# Patient Record
Sex: FEMALE | URBAN - METROPOLITAN AREA
[De-identification: names, ages, dates, MRNs, and addresses within clinical notes are randomized per-mention and may not be internally consistent; named-entity substitution may affect disease eponyms.]

---

## 2019-03-18 ENCOUNTER — NURSE TRIAGE (OUTPATIENT)
Dept: NURSING | Facility: CLINIC | Age: 64
End: 2019-03-18

## 2019-03-18 NOTE — TELEPHONE ENCOUNTER
Completed antibiotics for a UTI days ago and is again experiencing blood in the urine.  Will see provider within 24 hours.  Lucille Alex RN  [facility]      Reason for Disposition    Blood in urine, but all triage questions negative  (Exception: could be normal menstrual bleeding)    Additional Information    Negative: Shock suspected (e.g., cold/pale/clammy skin, too weak to stand, low BP, rapid pulse)    Negative: Sounds like a life-threatening emergency to the triager    Negative: Recent back or abdominal injury    Negative: Recent genital injury    Negative: [1] Unable to urinate (or only a few drops) > 4 hours AND [2] bladder feels very full (e.g., palpable bladder or strong urge to urinate)    Negative: Passing pure blood or large blood clots (i.e., size > a dime) (Exception: herminia or small strands)    Negative: Fever > 100.5 F (38.1 C)    Negative: Patient sounds very sick or weak to the triager    Negative: Known sickle cell disease    Negative: Taking Coumadin (warfarin) or other strong blood thinner, or known bleeding disorder (e.g., thrombocytopenia)    Protocols used: URINE - BLOOD IN-ADULT-

## 2023-10-08 ENCOUNTER — TELEPHONE (OUTPATIENT)
Dept: NURSING | Facility: CLINIC | Age: 68
End: 2023-10-08

## 2023-10-08 NOTE — TELEPHONE ENCOUNTER
Telephone call  Patient calling to request a refill of her   albuterol sulfate 90  in haler every 4 hours as needed .  Paged the provider on call.  He Dr is Dr bob at the [facility].  Paged Dr Alcocer the [facility] for  [facility] Received no call back so sent patient to the walk in clinic to get her inhaler refilled.    Meeta Wick RN   [facility]  11:04 AM 10/8/2023